# Patient Record
Sex: MALE | Race: WHITE
[De-identification: names, ages, dates, MRNs, and addresses within clinical notes are randomized per-mention and may not be internally consistent; named-entity substitution may affect disease eponyms.]

---

## 2019-01-04 ENCOUNTER — HOSPITAL ENCOUNTER (OUTPATIENT)
Dept: HOSPITAL 47 - LABWHC1 | Age: 49
Discharge: HOME | End: 2019-01-04
Attending: FAMILY MEDICINE
Payer: COMMERCIAL

## 2019-01-04 VITALS — BODY MASS INDEX: 23.7 KG/M2

## 2019-01-04 DIAGNOSIS — R73.03: Primary | ICD-10-CM

## 2019-01-04 PROCEDURE — 97802 MEDICAL NUTRITION INDIV IN: CPT

## 2020-06-26 ENCOUNTER — HOSPITAL ENCOUNTER (OUTPATIENT)
Dept: HOSPITAL 47 - ORWHC2ENDO | Age: 50
Discharge: HOME | End: 2020-06-26
Attending: INTERNAL MEDICINE
Payer: COMMERCIAL

## 2020-06-26 VITALS — SYSTOLIC BLOOD PRESSURE: 132 MMHG | DIASTOLIC BLOOD PRESSURE: 80 MMHG | RESPIRATION RATE: 18 BRPM | HEART RATE: 67 BPM

## 2020-06-26 VITALS — BODY MASS INDEX: 20.7 KG/M2

## 2020-06-26 VITALS — TEMPERATURE: 98.5 F

## 2020-06-26 DIAGNOSIS — E07.9: ICD-10-CM

## 2020-06-26 DIAGNOSIS — E78.5: ICD-10-CM

## 2020-06-26 DIAGNOSIS — K59.00: Primary | ICD-10-CM

## 2020-06-26 DIAGNOSIS — I10: ICD-10-CM

## 2020-06-26 DIAGNOSIS — Z79.899: ICD-10-CM

## 2020-06-26 DIAGNOSIS — Z79.890: ICD-10-CM

## 2020-06-26 DIAGNOSIS — R19.7: ICD-10-CM

## 2020-06-26 PROCEDURE — 45378 DIAGNOSTIC COLONOSCOPY: CPT

## 2020-06-26 NOTE — P.PCN
Date of Procedure: 06/26/20


Procedure(s) Performed: 


BRIEF HISTORY: Patient is a 50-year-old pleasant white male scheduled for an 

elective colonoscopy as a part of evaluation change in bowel habits for the last

1 year duration.  Has been having alternating diarrhea and constipation.





PROCEDURE PERFORMED: Colonoscopy. 





PREOPERATIVE DIAGNOSIS: Change in bowel habits. 





IV sedation per Anesthesia. 





PROCEDURE: After informed consent was obtained, the patient, was brought into 

the endoscopy unit. IV sedation was administered by Anesthesia under continuous 

monitoring.  Digital rectal examination was normal. Initially the Olympus CF-160

flexible video colonoscope was then inserted in the rectum, gradually advanced 

into the cecum without any difficulty. Careful examination was performed as the 

scope was gradually being withdrawn. Ileocecal valve and the appendiceal orifice

were visualized and appeared normal.  Prep was excellent. Mucosa of the cecum, 

ascending colon, transverse colon, descending colon, sigmoid colon, and rectum 

appeared normal. Retroflexion was performed in the rectum and no lesions were 

seen. The patient tolerated the procedure well. 





IMPRESSION: Normal-appearing colon from rectum to cecum with no evidence of 

colorectal neoplasia .





RECOMMENDATIONS:  Findings of this examination were discussed with the patient 

as well as his family.  He will try a high-fiber diet and fiber supplements a 

regular basis.  He was advised to have a repeat screening colonoscopy in 10 

years.